# Patient Record
Sex: MALE
[De-identification: names, ages, dates, MRNs, and addresses within clinical notes are randomized per-mention and may not be internally consistent; named-entity substitution may affect disease eponyms.]

---

## 2022-03-17 ENCOUNTER — RESULT REVIEW (OUTPATIENT)
Age: 62
End: 2022-03-17

## 2022-10-27 PROBLEM — Z00.00 ENCOUNTER FOR PREVENTIVE HEALTH EXAMINATION: Status: ACTIVE | Noted: 2022-10-27

## 2022-11-03 ENCOUNTER — APPOINTMENT (OUTPATIENT)
Dept: HEART AND VASCULAR | Facility: CLINIC | Age: 62
End: 2022-11-03

## 2022-11-03 ENCOUNTER — NON-APPOINTMENT (OUTPATIENT)
Age: 62
End: 2022-11-03

## 2022-11-03 VITALS
SYSTOLIC BLOOD PRESSURE: 158 MMHG | HEART RATE: 87 BPM | DIASTOLIC BLOOD PRESSURE: 84 MMHG | OXYGEN SATURATION: 98 % | RESPIRATION RATE: 14 BRPM | TEMPERATURE: 97.9 F

## 2022-11-03 PROCEDURE — 93000 ELECTROCARDIOGRAM COMPLETE: CPT

## 2022-11-03 PROCEDURE — 99204 OFFICE O/P NEW MOD 45 MIN: CPT | Mod: 25

## 2022-11-03 RX ORDER — NYSTATIN AND TRIAMCINOLONE ACETONIDE 100000; 1 [USP'U]/G; MG/G
100000-0.1 OINTMENT TOPICAL
Qty: 15 | Refills: 0 | Status: ACTIVE | COMMUNITY
Start: 2022-10-24

## 2022-11-03 NOTE — HISTORY OF PRESENT ILLNESS
[FreeTextEntry1] : DESHAWN GIL is a 62 year y.o. M with medical history significant for HLD, HTN, carotid atherosclerosis.\par He is here for cardiac evaluation and BP control. \par He is overall in his usual state of health \par Denies CP, SOB, palpitations, orthopnea, dizziness, syncope, LH, DE ANDA\par Patient denies changes in his exercise tolerance during the past 6 months. He can walk 10 blocks and can climb 3 flights of stairs. \par Sleeps with 1 pillow\par \par He denies history of MI, CVA, DM. \par Denies family history of premature ASCVD and /or SCD\par \par No hospitalizations in the past 3 years.\par \par \par ECG (11/3/22): NSR at 74 bpm with nl axis and no STT abnormality \par \par Carotid US (6/24/21): Plaque in the right and left ICA is mild, soft, smooth and homogenous \par Carotid US (2016): No hemodynamically significant arterial disease in the internal carotid artery b/l.\par \par Echo (6/24/21): EF 60%. Nl LV size and function. Mild MR. \par ECHO (12/2016): Nl LV size and function EF 64%.  Grade I DD. Trace AR. Trace TR. \par \par Renal doppler (6/24/21): Normal renal artery b/l. \par Abd Ao US (1/6/22): Mild atherosclerotic plaque presents in the abdominal aorta . No evidence of atherosclerotic plaque in the iliac artery b/l.\par \par Holter (1/6/22): Min 50 bpm, Avg 68 bpm, Max 116 bpm. PACs. \par \par LABS (8/30/22): ; ; HDL 60; NonHDL 154; ; Cre 0.91; K 5.3; LFTs wnl; eGFR 96; Hgb 14.3; Hct 42.3; TSH 1.19; A1c 5.4; \par (4/28/20): ; ; HDL 46; ; Cre 0.82; eGFR 97; K 4.5; LFTs wnl; TSH 1.39;

## 2022-11-03 NOTE — PHYSICAL EXAM
[Well Developed] : well developed [Well Nourished] : well nourished [No Acute Distress] : no acute distress [Normal Venous Pressure] : normal venous pressure [No Carotid Bruit] : no carotid bruit [Normal S1, S2] : normal S1, S2 [No Rub] : no rub [No Murmur] : no murmur [No Gallop] : no gallop [Clear Lung Fields] : clear lung fields [Good Air Entry] : good air entry [No Respiratory Distress] : no respiratory distress  [Normal Gait] : normal gait [No Edema] : no edema [No Cyanosis] : no cyanosis [No Clubbing] : no clubbing [Moves all extremities] : moves all extremities [No Focal Deficits] : no focal deficits [Normal Speech] : normal speech [Alert and Oriented] : alert and oriented [Normal memory] : normal memory

## 2022-11-03 NOTE — REVIEW OF SYSTEMS
[Anxiety] : anxiety [Under Stress] : under stress [Fever] : no fever [Headache] : no headache [Chills] : no chills [Feeling Fatigued] : not feeling fatigued [SOB] : no shortness of breath [Dyspnea on exertion] : not dyspnea during exertion [Chest Discomfort] : no chest discomfort [Lower Ext Edema] : no extremity edema [Leg Claudication] : no intermittent leg claudication [Palpitations] : no palpitations [Orthopnea] : no orthopnea [PND] : no PND [Syncope] : no syncope [Cough] : no cough [Wheezing] : no wheezing [Coughing Up Blood] : no hemoptysis [Dizziness] : no dizziness [Convulsions] : no convulsions [Limb Weakness (Paresis)] : no limb weakness (Paresis) [Confusion] : no confusion was observed [Depression] : no depression [Suicidal] : not suicidal [Easy Bleeding] : no tendency for easy bleeding

## 2022-11-03 NOTE — ASSESSMENT
[FreeTextEntry1] : DESHAWN GIL is a 62 year M with past medical history significant for HTN, HLD, carotid atherosclerosis, anxiety disorder. He is here for cardiac evaluation.  BP is uncontrolled. he has no cardiac complaints at this time. He reports good exercise tolerance. \par - I reviewed ECG --> Normal.\par - I reviewed labs \par - I reviewed echo report\par - I reviewed holter report\par - I reviewed carotid US report \par - He declines starting BP meds. He declines taking statins. he wishes to continue trying lifestyle changes and does not wish to take any medications at this time. \par - Due to multiple risk factors he wishes to have a calcium score to screen for premature CAD. \par - Increase ambulation as tolerated to aim for approximately 30 minutes of moderate activity 5 days a week.  Heart healthy diet low in sodium, sugars and saturated fats and high in fruits, vegetables and whole grains.  Weight loss.\par \par

## 2022-12-08 ENCOUNTER — NON-APPOINTMENT (OUTPATIENT)
Age: 62
End: 2022-12-08

## 2023-01-26 ENCOUNTER — APPOINTMENT (OUTPATIENT)
Dept: HEART AND VASCULAR | Facility: CLINIC | Age: 63
End: 2023-01-26
Payer: COMMERCIAL

## 2023-01-26 VITALS
HEART RATE: 80 BPM | SYSTOLIC BLOOD PRESSURE: 133 MMHG | DIASTOLIC BLOOD PRESSURE: 95 MMHG | TEMPERATURE: 98 F | OXYGEN SATURATION: 97 % | RESPIRATION RATE: 16 BRPM

## 2023-01-26 DIAGNOSIS — I10 ESSENTIAL (PRIMARY) HYPERTENSION: ICD-10-CM

## 2023-01-26 DIAGNOSIS — E78.5 HYPERLIPIDEMIA, UNSPECIFIED: ICD-10-CM

## 2023-01-26 DIAGNOSIS — F41.9 ANXIETY DISORDER, UNSPECIFIED: ICD-10-CM

## 2023-01-26 DIAGNOSIS — R93.1 ABNORMAL FINDINGS ON DIAGNOSTIC IMAGING OF HEART AND CORONARY CIRCULATION: ICD-10-CM

## 2023-01-26 DIAGNOSIS — I65.29 OCCLUSION AND STENOSIS OF UNSPECIFIED CAROTID ARTERY: ICD-10-CM

## 2023-01-26 PROCEDURE — 99214 OFFICE O/P EST MOD 30 MIN: CPT

## 2023-01-26 RX ORDER — ASPIRIN ENTERIC COATED TABLETS 81 MG 81 MG/1
81 TABLET, DELAYED RELEASE ORAL
Qty: 90 | Refills: 1 | Status: ACTIVE | COMMUNITY
Start: 2023-01-26 | End: 1900-01-01

## 2023-01-26 RX ORDER — LISINOPRIL 5 MG/1
5 TABLET ORAL
Qty: 90 | Refills: 1 | Status: ACTIVE | COMMUNITY
Start: 2023-01-26 | End: 1900-01-01

## 2023-01-26 RX ORDER — ATORVASTATIN CALCIUM 20 MG/1
20 TABLET, FILM COATED ORAL
Qty: 1 | Refills: 1 | Status: ACTIVE | COMMUNITY
Start: 2023-01-26 | End: 1900-01-01

## 2023-01-26 NOTE — ASSESSMENT
[FreeTextEntry1] : DESHAWN GIL is a 62 year M with past medical history significant for HTN, HLD, carotid atherosclerosis, anxiety disorder. He is here for follow-up.  BP is uncontrolled. he has no cardiac complaints at this time. He reports good exercise tolerance. \par - I reviewed coronary calcium score report and discussed the results with the patient. \par - I recommend starting statins, BP meds, and Aspirin . However, he wants to attempt lifestyle changes and will consider taking medications, though he's not sure yet whether he will take them. \par - stress management \par - We had an extensive conversation regarding the importance of lifestyle changes and risk factor modification. \par - Increase ambulation as tolerated to aim for approximately 30 minutes of moderate activity 5 days a week.  Heart healthy diet low in sodium, sugars and saturated fats and high in fruits, vegetables and whole grains.  Weight loss.\par \par \par RTO in 6 mo/prn. \par

## 2023-01-26 NOTE — HISTORY OF PRESENT ILLNESS
[FreeTextEntry1] : DESHAWN GIL is a 62 year y.o. M with medical history significant for HLD, HTN, carotid atherosclerosis.\par He is here for follow-up and BP control. \par He had a calcium score = 156. \par His home BP readings have been 140s/ mmHg \par He is overall in his usual state of health \par Denies CP, SOB, palpitations, orthopnea, dizziness, syncope, LH, DE ANDA, edema \par Patient denies changes in his exercise tolerance during the past 6 months. He can walk 10 blocks and can climb 3 flights of stairs. \par Sleeps with 1 pillow\par \par He denies history of MI, CVA, DM. \par Denies family history of premature ASCVD and /or SCD\par \par No hospitalizations in the past 3 years.\par \par \par ECG (11/3/22): NSR at 74 bpm with nl axis and no STT abnormality \par \par Carotid US (6/24/21): Plaque in the right and left ICA is mild, soft, smooth and homogenous \par Carotid US (2016): No hemodynamically significant arterial disease in the internal carotid artery b/l.\par \par Echo (6/24/21): EF 60%. Nl LV size and function. Mild MR. \par ECHO (12/2016): Nl LV size and function EF 64%.  Grade I DD. Trace AR. Trace TR. \par \par Renal doppler (6/24/21): Normal renal artery b/l. \par Abd Ao US (1/6/22): Mild atherosclerotic plaque presents in the abdominal aorta . No evidence of atherosclerotic plaque in the iliac artery b/l.\par \par Calcium score (12/29/22) = 156\par LM=0; LAD =133; LCx = 18; RCA 5. \par \par Holter (1/6/22): Min 50 bpm, Avg 68 bpm, Max 116 bpm. PACs. \par \par LABS (8/30/22): ; ; HDL 60; NonHDL 154; ; Cre 0.91; K 5.3; LFTs wnl; eGFR 96; Hgb 14.3; Hct 42.3; TSH 1.19; A1c 5.4; \par (4/28/20): ; ; HDL 46; ; Cre 0.82; eGFR 97; K 4.5; LFTs wnl; TSH 1.39;

## 2023-01-26 NOTE — REVIEW OF SYSTEMS
[Anxiety] : anxiety [Under Stress] : under stress [Fever] : no fever [Headache] : no headache [Chills] : no chills [Feeling Fatigued] : not feeling fatigued [SOB] : no shortness of breath [Chest Discomfort] : no chest discomfort [Dyspnea on exertion] : not dyspnea during exertion [Lower Ext Edema] : no extremity edema [Leg Claudication] : no intermittent leg claudication [Palpitations] : no palpitations [Orthopnea] : no orthopnea [PND] : no PND [Syncope] : no syncope [Cough] : no cough [Wheezing] : no wheezing [Coughing Up Blood] : no hemoptysis [Dizziness] : no dizziness [Convulsions] : no convulsions [Limb Weakness (Paresis)] : no limb weakness (Paresis) [Confusion] : no confusion was observed [Depression] : no depression [Suicidal] : not suicidal [Easy Bleeding] : no tendency for easy bleeding

## 2024-02-02 ENCOUNTER — NON-APPOINTMENT (OUTPATIENT)
Age: 64
End: 2024-02-02

## 2024-06-06 ENCOUNTER — NON-APPOINTMENT (OUTPATIENT)
Age: 64
End: 2024-06-06